# Patient Record
Sex: MALE | Race: WHITE | URBAN - METROPOLITAN AREA
[De-identification: names, ages, dates, MRNs, and addresses within clinical notes are randomized per-mention and may not be internally consistent; named-entity substitution may affect disease eponyms.]

---

## 2021-03-10 ENCOUNTER — NEW PATIENT (OUTPATIENT)
Dept: URBAN - METROPOLITAN AREA CLINIC 51 | Facility: CLINIC | Age: 71
End: 2021-03-10
Payer: OTHER MISCELLANEOUS

## 2021-03-10 DIAGNOSIS — H25.13 AGE-RELATED NUCLEAR CATARACT, BILATERAL: ICD-10-CM

## 2021-03-10 DIAGNOSIS — H43.313 VITREOUS MEMBRANES AND STRANDS, BILATERAL: ICD-10-CM

## 2021-03-10 DIAGNOSIS — H52.4 PRESBYOPIA: ICD-10-CM

## 2021-03-10 PROCEDURE — 92004 COMPRE OPH EXAM NEW PT 1/>: CPT | Performed by: OPTOMETRIST

## 2021-03-10 PROCEDURE — 92250 FUNDUS PHOTOGRAPHY W/I&R: CPT | Performed by: OPTOMETRIST

## 2021-03-10 ASSESSMENT — INTRAOCULAR PRESSURE
OD: 15
OS: 15

## 2021-03-10 ASSESSMENT — VISUAL ACUITY
OD: 20/20
OS: 20/20

## 2021-03-10 ASSESSMENT — KERATOMETRY
OD: 47.24
OS: 46.30

## 2022-02-09 ENCOUNTER — OFFICE VISIT (OUTPATIENT)
Dept: URBAN - METROPOLITAN AREA CLINIC 51 | Facility: CLINIC | Age: 72
End: 2022-02-09
Payer: OTHER MISCELLANEOUS

## 2022-02-09 DIAGNOSIS — E11.9 TYPE 2 DIABETES MELLITUS WITHOUT COMPLICATIONS: ICD-10-CM

## 2022-02-09 DIAGNOSIS — Z79.84 LONG TERM (CURRENT) USE OF ORAL ANTIDIABETIC DRUGS: ICD-10-CM

## 2022-02-09 PROCEDURE — 99214 OFFICE O/P EST MOD 30 MIN: CPT | Performed by: OPTOMETRIST

## 2022-02-09 PROCEDURE — 92134 CPTRZ OPH DX IMG PST SGM RTA: CPT | Performed by: OPTOMETRIST

## 2022-02-09 PROCEDURE — 92250 FUNDUS PHOTOGRAPHY W/I&R: CPT | Performed by: OPTOMETRIST

## 2022-02-09 ASSESSMENT — INTRAOCULAR PRESSURE
OS: 16
OD: 15

## 2022-02-09 ASSESSMENT — VISUAL ACUITY
OS: 20/30
OD: 20/25

## 2022-02-09 ASSESSMENT — KERATOMETRY: OS: 46.75

## 2022-02-09 NOTE — IMPRESSION/PLAN
Impression: Age-related nuclear cataract, bilateral Plan: Cataract causing symptomatic  impairment of visual function not correctable with a tolerable change in glasses or contact lenses resulting in the patient's inability to function satisfactorily while performing Activities of Daily Life including, but not limited to reading, viewing television, driving, or meeting vocational or recreational needs. Cataracts account for the patient's complaints. Discussed all risks, benefits, procedures and recovery. Patient desires to have surgery, recommend phacoemulsification with intraocular lens. 
Aim OD: pl  Aim OS: pl

## 2022-02-09 NOTE — IMPRESSION/PLAN
Impression: Vitreous membranes and strands, bilateral Plan: photos 
 retina intact , no treatment necessary

## 2023-02-14 ENCOUNTER — OFFICE VISIT (OUTPATIENT)
Dept: URBAN - METROPOLITAN AREA CLINIC 51 | Facility: CLINIC | Age: 73
End: 2023-02-14
Payer: MEDICARE

## 2023-02-14 DIAGNOSIS — H04.123 TEAR FILM INSUFFICIENCY OF BILATERAL LACRIMAL GLANDS: ICD-10-CM

## 2023-02-14 DIAGNOSIS — Z79.84 LONG TERM (CURRENT) USE OF ORAL ANTIDIABETIC DRUGS: ICD-10-CM

## 2023-02-14 DIAGNOSIS — H25.13 AGE-RELATED NUCLEAR CATARACT, BILATERAL: ICD-10-CM

## 2023-02-14 DIAGNOSIS — H43.313 VITREOUS MEMBRANES AND STRANDS, BILATERAL: ICD-10-CM

## 2023-02-14 DIAGNOSIS — E11.9 TYPE 2 DIABETES MELLITUS WITHOUT COMPLICATIONS: Primary | ICD-10-CM

## 2023-02-14 PROCEDURE — 99214 OFFICE O/P EST MOD 30 MIN: CPT | Performed by: OPTOMETRIST

## 2023-02-14 PROCEDURE — 92134 CPTRZ OPH DX IMG PST SGM RTA: CPT | Performed by: OPTOMETRIST

## 2023-02-14 PROCEDURE — 92250 FUNDUS PHOTOGRAPHY W/I&R: CPT | Performed by: OPTOMETRIST

## 2023-02-14 ASSESSMENT — INTRAOCULAR PRESSURE
OD: 13
OS: 13

## 2023-02-14 ASSESSMENT — KERATOMETRY
OS: 46.63
OD: 46.25

## 2023-02-14 ASSESSMENT — VISUAL ACUITY
OD: 20/25
OS: 20/25

## 2023-02-14 NOTE — IMPRESSION/PLAN
Impression: Age-related nuclear cataract, bilateral Plan: Cataract causing symptomatic  impairment of visual function not correctable with a tolerable change in glasses or contact lenses resulting in the patient's inability to function satisfactorily while performing Activities of Daily Life including, but not limited to reading, viewing television, driving, or meeting vocational or recreational needs. Cataracts account for the patient's complaints. Discussed all risks, benefits, procedures and recovery. Patient desires to have surgery, recommend phacoemulsification with intraocular lens. 
Aim OD: plano  Aim OS: plano

## 2023-02-14 NOTE — IMPRESSION/PLAN
Impression: Tear film insufficiency of bilateral lacrimal glands: H04.123.  Plan: Start Artificial tears qid OU 
avoid direct air flow/blink fully/hydrate

## 2023-02-14 NOTE — IMPRESSION/PLAN
Impression: Vitreous membranes and strands, bilateral Plan: macular oct
 retina intact , no treatment necessary

## 2023-04-07 ENCOUNTER — TESTING ONLY (OUTPATIENT)
Dept: URBAN - METROPOLITAN AREA CLINIC 56 | Facility: LOCATION | Age: 73
End: 2023-04-07
Payer: MEDICARE

## 2023-04-07 DIAGNOSIS — Z01.818 ENCOUNTER FOR OTHER PREPROCEDURAL EXAMINATION: Primary | ICD-10-CM

## 2023-04-07 DIAGNOSIS — H25.13 AGE-RELATED NUCLEAR CATARACT, BILATERAL: ICD-10-CM

## 2023-04-07 PROCEDURE — 99203 OFFICE O/P NEW LOW 30 MIN: CPT | Performed by: PHYSICIAN ASSISTANT

## 2023-04-07 RX ORDER — LISINOPRIL 20 MG/1
20 MG TABLET ORAL
Qty: 0 | Refills: 0 | Status: ACTIVE
Start: 2023-04-07

## 2023-04-07 RX ORDER — HYDROCHLOROTHIAZIDE 12.5 MG/1
12.5 MG CAPSULE ORAL
Qty: 0 | Refills: 0 | Status: ACTIVE
Start: 2023-04-07

## 2023-04-07 RX ORDER — SIMVASTATIN 40 MG/1
40 MG TABLET, FILM COATED ORAL
Qty: 0 | Refills: 0 | Status: ACTIVE
Start: 2023-04-07

## 2023-04-07 RX ORDER — CARVEDILOL 6.25 MG/1
6.25 MG TABLET, FILM COATED ORAL
Qty: 0 | Refills: 0 | Status: ACTIVE
Start: 2023-04-07

## 2023-04-07 RX ORDER — EMPAGLIFLOZIN 25 MG/1
25 MG TABLET, FILM COATED ORAL
Qty: 0 | Refills: 0 | Status: ACTIVE
Start: 2023-04-07

## 2023-04-10 ENCOUNTER — OFFICE VISIT (OUTPATIENT)
Dept: URBAN - METROPOLITAN AREA CLINIC 44 | Facility: CLINIC | Age: 73
End: 2023-04-10
Payer: MEDICARE

## 2023-04-10 DIAGNOSIS — H04.123 TEAR FILM INSUFFICIENCY OF BILATERAL LACRIMAL GLANDS: ICD-10-CM

## 2023-04-10 DIAGNOSIS — E11.9 TYPE 2 DIABETES MELLITUS WITHOUT COMPLICATIONS: ICD-10-CM

## 2023-04-10 DIAGNOSIS — H25.12 AGE-RELATED NUCLEAR CATARACT, LEFT EYE: ICD-10-CM

## 2023-04-10 DIAGNOSIS — H43.313 VITREOUS MEMBRANES AND STRANDS, BILATERAL: ICD-10-CM

## 2023-04-10 DIAGNOSIS — Z79.84 LONG TERM (CURRENT) USE OF ORAL ANTIDIABETIC DRUGS: ICD-10-CM

## 2023-04-10 DIAGNOSIS — H25.13 AGE-RELATED NUCLEAR CATARACT, BILATERAL: Primary | ICD-10-CM

## 2023-04-10 PROCEDURE — 99204 OFFICE O/P NEW MOD 45 MIN: CPT | Performed by: OPHTHALMOLOGY

## 2023-04-10 ASSESSMENT — INTRAOCULAR PRESSURE
OS: 15
OD: 15

## 2023-04-10 NOTE — IMPRESSION/PLAN
Impression: Age-related nuclear cataract, bilateral Plan: Discussed cataract diagnosis with the patient. Cataracts are limiting vision. Discussed risks, benefits and alternatives to surgery including but not limited to: bleeding, infection, risk of vision loss, loss of the eye, need for other surgery. Patient voiced understanding and wishes to proceed. Patient elects surgical treatment. Specialty lens options discussed and pt declines. Patient desires surgery OU,  OD  first, Standard IOL  (( AIM : -0.25 OU, INJECTABLE OU (DEXTENZA FIRST CHOICE ,TRIMOXI SECOND CHOICE),  NO LENSX, NO ORA , NO LRI OU- AMP )) Patient understands the need for glasses after surgery for BCVA.

## 2023-04-10 NOTE — IMPRESSION/PLAN
Impression: Tear film insufficiency of bilateral lacrimal glands: H04.123.  Plan: Start Artificial tears qid OU 
avoid direct air flow/blink fully/hydrate  Discussed with patient will limit the vision post cataract surgery

## 2023-04-10 NOTE — IMPRESSION/PLAN
Impression: Type 2 diabetes mellitus without complications Plan: No Non-Proliferative Diabetic Retinopathy, no Diabetic Macular Edema and no Neovascularization of the iris, disc, or elsewhere. Discussed ocular and systemic benefits of blood sugar control. Send notes to PCP.   Discussed with patient will limit the vision post cataract surgery

## 2023-04-20 ENCOUNTER — SURGERY (OUTPATIENT)
Dept: URBAN - METROPOLITAN AREA SURGERY 19 | Facility: SURGERY | Age: 73
End: 2023-04-20
Payer: MEDICARE

## 2023-04-20 DIAGNOSIS — H25.13 AGE-RELATED NUCLEAR CATARACT, BILATERAL: Primary | ICD-10-CM

## 2023-04-20 PROCEDURE — 66984 XCAPSL CTRC RMVL W/O ECP: CPT | Performed by: OPHTHALMOLOGY

## 2023-04-21 ENCOUNTER — POST-OPERATIVE VISIT (OUTPATIENT)
Dept: URBAN - METROPOLITAN AREA CLINIC 44 | Facility: CLINIC | Age: 73
End: 2023-04-21
Payer: MEDICARE

## 2023-04-21 DIAGNOSIS — Z48.810 ENCOUNTER FOR SURGICAL AFTERCARE FOLLOWING SURGERY ON THE SENSE ORGANS: Primary | ICD-10-CM

## 2023-04-21 PROCEDURE — 99024 POSTOP FOLLOW-UP VISIT: CPT | Performed by: OPTOMETRIST

## 2023-04-21 ASSESSMENT — INTRAOCULAR PRESSURE: OD: 18

## 2023-04-21 NOTE — IMPRESSION/PLAN
Impression: S/P Cataract Extraction by phacoemulsification with IOL placement OD - 1 Day. Encounter for surgical aftercare following surgery on a sense organ  Z48.810. Plan: Post-op instructions reviewed.

## 2023-04-27 ENCOUNTER — POST-OPERATIVE VISIT (OUTPATIENT)
Dept: URBAN - METROPOLITAN AREA CLINIC 51 | Facility: CLINIC | Age: 73
End: 2023-04-27
Payer: MEDICARE

## 2023-04-27 DIAGNOSIS — Z48.810 ENCOUNTER FOR SURGICAL AFTERCARE FOLLOWING SURGERY ON A SENSE ORGAN: Primary | ICD-10-CM

## 2023-04-27 PROCEDURE — 99024 POSTOP FOLLOW-UP VISIT: CPT | Performed by: OPTOMETRIST

## 2023-04-27 ASSESSMENT — INTRAOCULAR PRESSURE: OD: 12

## 2023-04-27 ASSESSMENT — VISUAL ACUITY: OD: 20/25

## 2023-04-27 NOTE — IMPRESSION/PLAN
Impression: S/P Cataract Extraction by phacoemulsification with IOL placement OD - 7 Days. Encounter for surgical aftercare following surgery on a sense organ  Z48.810. Plan: Reviewed with patient post op findings. Healing well. Pt reviewed on moderate corneal swelling and inflammation observed on today's examination which accounts for blurred vision and various shapes of floaters are to be expected with injectable meds. Vision should continue to improve each day of the week. If no improvement, pt to call and RTC sooner. Artificial tears from a list of preferred brands that was dispensed for review
or what was included in patient's post op kit to be used 1 drop four times daily or more when needed. Preservative free options if more than 6 drops used daily. Eye shield nightly as instructed and do not rub surgical eye. Return to clinic as scheduled or sooner if any significant worsening of symptoms or vision decline. Pt understands.

## 2023-05-05 ENCOUNTER — POST-OPERATIVE VISIT (OUTPATIENT)
Dept: URBAN - METROPOLITAN AREA CLINIC 51 | Facility: CLINIC | Age: 73
End: 2023-05-05
Payer: MEDICARE

## 2023-05-05 DIAGNOSIS — Z96.1 PRESENCE OF INTRAOCULAR LENS: Primary | ICD-10-CM

## 2023-05-05 PROCEDURE — 99024 POSTOP FOLLOW-UP VISIT: CPT | Performed by: OPTOMETRIST

## 2023-05-05 ASSESSMENT — INTRAOCULAR PRESSURE: OS: 20

## 2023-05-05 NOTE — IMPRESSION/PLAN
Impression:  Presence of intraocular lens  Z96.1. Plan: Doing well POD1. Moderate swelling and inflammation noted on today's examination. Discussed with patient that vision will continue to improve as swelling and inflammation resolves. Restrictions apply for 1 week. Drop instructions:
1) START Pred TID x 1 week, BID x 1 week, then stop (sample dispensed to patient) 2) AT's for comfort RTC as scheduled for final post op appointment

## 2023-06-01 ENCOUNTER — POST-OPERATIVE VISIT (OUTPATIENT)
Dept: URBAN - METROPOLITAN AREA CLINIC 51 | Facility: CLINIC | Age: 73
End: 2023-06-01
Payer: MEDICARE

## 2023-06-01 DIAGNOSIS — Z96.1 PRESENCE OF INTRAOCULAR LENS: Primary | ICD-10-CM

## 2023-06-01 PROCEDURE — 99024 POSTOP FOLLOW-UP VISIT: CPT | Performed by: OPTOMETRIST

## 2023-06-01 ASSESSMENT — INTRAOCULAR PRESSURE
OD: 12
OS: 12

## 2023-06-01 ASSESSMENT — VISUAL ACUITY
OD: 20/20
OS: 20/25

## 2023-06-01 NOTE — IMPRESSION/PLAN
Impression: S/P Cataract Extraction by phacoemulsification with IOL placement OS - 28 Days. Presence of intraocular lens  Z96.1.  Plan: expected post op 
+1.75 otc readers 
continue AT qid OU 
report any change in vision / light sensitivity or pain

## 2024-03-06 ENCOUNTER — OFFICE VISIT (OUTPATIENT)
Dept: URBAN - METROPOLITAN AREA CLINIC 51 | Facility: CLINIC | Age: 74
End: 2024-03-06
Payer: MEDICARE

## 2024-03-06 DIAGNOSIS — Z79.84 LONG TERM (CURRENT) USE OF ORAL ANTIDIABETIC DRUGS: ICD-10-CM

## 2024-03-06 DIAGNOSIS — E11.9 TYPE 2 DIABETES MELLITUS WITHOUT COMPLICATIONS: Primary | ICD-10-CM

## 2024-03-06 DIAGNOSIS — H43.313 VITREOUS MEMBRANES AND STRANDS, BILATERAL: ICD-10-CM

## 2024-03-06 DIAGNOSIS — H04.123 TEAR FILM INSUFFICIENCY OF BILATERAL LACRIMAL GLANDS: ICD-10-CM

## 2024-03-06 PROCEDURE — 92134 CPTRZ OPH DX IMG PST SGM RTA: CPT | Performed by: OPTOMETRIST

## 2024-03-06 PROCEDURE — 99214 OFFICE O/P EST MOD 30 MIN: CPT | Performed by: OPTOMETRIST

## 2024-03-06 ASSESSMENT — VISUAL ACUITY
OS: 20/25
OD: 20/20

## 2024-03-06 ASSESSMENT — KERATOMETRY
OS: 46.88
OD: 47.13

## 2024-03-06 ASSESSMENT — INTRAOCULAR PRESSURE
OS: 12
OD: 13